# Patient Record
(demographics unavailable — no encounter records)

---

## 2025-04-05 NOTE — HISTORY OF PRESENT ILLNESS
[de-identified] : sore throat/cough [FreeTextEntry6] : Presents with c/o cough/congestion x few days. Seems to be feeling better.  Mild sore throat. +PND NO fever.  Meds given: Motrin/Tylenol.  Appetite/activity at baseline, drinking well, good UO.  No vomiting/No diarrhea.   + school/sick contacts. +exposure to strep.

## 2025-04-05 NOTE — DISCUSSION/SUMMARY
[FreeTextEntry1] :  12 year female with acute URI/pharyngitis Likely viral - no indication for antibiotic use at this time.   Rapid strep neg, TCX sent will follow up with results.  Supportive care reviewed -- Zyrtec/Flonase as directed, Nasal saline PRN, humidifier Good hydration discussed & good hand hygiene reviewed  If fever develops/persists > 48hr return for re-eval. RED FLAGS REVIEWED - indications for ED eval discussed, signs of distress/dehydration reviewed - Mom agrees with plan, demonstrates an understanding, is able to repeat back instructions and has no questions at this time.   AAP 5210 reviewed -- once feeling better may resume normal activity & diet.  Return sooner PRN.  Well care as scheduled.

## 2025-05-09 NOTE — DISCUSSION/SUMMARY
[FreeTextEntry1] : noted that progression of sx likely c/w viral exanthem  no specific treatment indicated  reviewed negative strep testing supportive care measures encouraged may contact office with any additional or worsened symptoms, sooner with any other concerns

## 2025-05-09 NOTE — PHYSICAL EXAM
[NL] : moves all extremities x4, warm, well perfused x4 [Erythematous] : erythematous [Macules] : macules [Arms] : arms [Hands] : hands [Legs] : legs